# Patient Record
Sex: MALE | Race: BLACK OR AFRICAN AMERICAN
[De-identification: names, ages, dates, MRNs, and addresses within clinical notes are randomized per-mention and may not be internally consistent; named-entity substitution may affect disease eponyms.]

---

## 2018-05-20 ENCOUNTER — HOSPITAL ENCOUNTER (EMERGENCY)
Dept: HOSPITAL 61 - ER | Age: 23
Discharge: HOME | End: 2018-05-20
Payer: COMMERCIAL

## 2018-05-20 DIAGNOSIS — R53.81: ICD-10-CM

## 2018-05-20 DIAGNOSIS — R09.81: ICD-10-CM

## 2018-05-20 DIAGNOSIS — R11.2: Primary | ICD-10-CM

## 2018-05-20 DIAGNOSIS — Z88.0: ICD-10-CM

## 2018-05-20 DIAGNOSIS — F12.10: ICD-10-CM

## 2018-05-20 DIAGNOSIS — J45.909: ICD-10-CM

## 2018-05-20 PROCEDURE — 96361 HYDRATE IV INFUSION ADD-ON: CPT

## 2018-05-20 PROCEDURE — 96374 THER/PROPH/DIAG INJ IV PUSH: CPT

## 2018-05-20 PROCEDURE — 96375 TX/PRO/DX INJ NEW DRUG ADDON: CPT

## 2018-05-20 PROCEDURE — 99285 EMERGENCY DEPT VISIT HI MDM: CPT

## 2018-05-20 RX ADMIN — PROCHLORPERAZINE EDISYLATE 1 MG: 5 INJECTION INTRAMUSCULAR; INTRAVENOUS at 01:57

## 2018-05-20 RX ADMIN — BACITRACIN 1 MLS/HR: 5000 INJECTION, POWDER, FOR SOLUTION INTRAMUSCULAR at 01:58

## 2020-04-30 ENCOUNTER — HOSPITAL ENCOUNTER (EMERGENCY)
Dept: HOSPITAL 61 - ER | Age: 25
Discharge: HOME | End: 2020-04-30
Payer: COMMERCIAL

## 2020-04-30 VITALS — BODY MASS INDEX: 34.35 KG/M2 | WEIGHT: 245.37 LBS | HEIGHT: 71 IN

## 2020-04-30 VITALS — SYSTOLIC BLOOD PRESSURE: 154 MMHG | DIASTOLIC BLOOD PRESSURE: 87 MMHG

## 2020-04-30 DIAGNOSIS — Z76.0: ICD-10-CM

## 2020-04-30 DIAGNOSIS — Z88.8: ICD-10-CM

## 2020-04-30 DIAGNOSIS — R06.02: Primary | ICD-10-CM

## 2020-04-30 DIAGNOSIS — J45.909: ICD-10-CM

## 2020-04-30 PROCEDURE — 71046 X-RAY EXAM CHEST 2 VIEWS: CPT

## 2020-04-30 PROCEDURE — 99283 EMERGENCY DEPT VISIT LOW MDM: CPT

## 2020-04-30 NOTE — PHYS DOC
Past Medical History


Past Medical History:  Asthma


 (CHRISTOPHE OTT)


Past Surgical History:  No Surgical History


 (CHRISTOPHE OTT)


Smoking Status:  Never Smoker


Alcohol Use:  None


Drug Use:  Marijuana


 (CHRISTOPHE OTT)





General Adult


EDM:


Chief Complaint:  SHORTNESS OF BREATH





HPI:


HPI:





Patient is a 24  year old AA male who presents to the ER with complains of 

shortness of breath at night and intermittent wheezing for the last week. Pt 

states he has had difficulty staying asleep because of his asthma. He is out of 

his flovent inhaler and has been using his albuterol as needed.  Patient denies 

any exposure to anyone with COVID-19.  Patient states he has been laid off from 

work and has been staying at home.  He denies any fever, body aches, fatigue, 

nausea, vomiting, diarrhea, abdominal pain, sore throat, headache, cough, or 

chest pain.  He currently denies any pain.


 (CHRISTOPHE OTT)





Review of Systems:


Review of Systems:


Constitutional:  Denies fever or chills. []


Eyes:  Denies change in visual acuity. []


HENT:  Denies nasal congestion or sore throat. [] 


Respiratory:  Denies cough or shortness of breath. [] 


Cardiovascular:  Denies chest pain or edema. [] 


GI:  Denies abdominal pain, nausea, vomiting, or diarrhea. [] 


Musculoskeletal:  Denies back pain or joint pain. [] 


Integument:  Denies rash. [] 


Neurologic:  Denies headache, focal weakness or sensory changes. [] 


Psychiatric:  Denies depression or anxiety. []


 (CHRISTOPHE OTT)





Heart Score:


Risk Factors:


Risk Factors:  DM, Current or recent (<one month) smoker, HTN, HLP, family 

history of CAD, obesity.


Risk Scores:


Score 0 - 3:  2.5% MACE over next 6 weeks - Discharge Home


Score 4 - 6:  20.3% MACE over next 6 weeks - Admit for Clinical Observation


Score 7 - 10:  72.7% MACE over next 6 weeks - Early Invasive Strategies


 (CHRISTOPHE OTT)





Allergies:


Allergies:





Allergies








Coded Allergies Type Severity Reaction Last Updated Verified


 


  nickel Allergy Unknown  5/20/18 Yes








 (CHRISTOPHE OTT)





Physical Exam:


PE:





Constitutional: Well developed, well nourished, no acute distress, non-toxic 

appearance, obese. []


HENT: Normocephalic, atraumatic, bilateral external ears normal, oropharynx 

moist, no oral exudates, nose normal. []


Eyes: PERRLA, EOMI, conjunctiva normal, no discharge. [] 


Neck: Normal range of motion,, no stridor. [] 


Cardiovascular:Heart rate regular rhythm, no murmur []


Lungs & Thorax:  Bilateral breath sounds clear to auscultation, Respirations 

even and unlabored, no retractions, no respiratory distress []


Skin: Warm, dry, no erythema, no rash. [] 


Back: No tenderness


Extremities: No cyanosis, ROM intact, no edema. [] 


Neurologic: Alert and oriented X 3, no focal deficits noted. []


Psychologic: Affect normal, judgement normal, mood normal. []


 (CHRISTOPHE OTT)





Current Patient Data:


Vital Signs:





                                   Vital Signs








  Date Time  Temp Pulse Resp B/P (MAP) Pulse Ox O2 Delivery O2 Flow Rate FiO2


 


4/30/20 15:20 98.2 95 20 154/87 (109) 96 Room Air  





 98.2       








 (CHRISTOPHE OTT)





EKG:


EKG:


[]


 (CHRISTOPHE OTT)





Radiology/Procedures:


Radiology/Procedures:


Chest x-ray normal read by Dr. Garcia []


 (CHRISTOPHE OTT)





Course & Med Decision Making:


Course & Med Decision Making


Pertinent Labs and Imaging studies reviewed. (See chart for details)





[]


 (CHRISTOPHE OTT)





Dragon Disclaimer:


Dragon Disclaimer:


This electronic medical record was generated, in whole or in part, using a voice

 recognition dictation system.


 (CHRISTOPHE OTT)





Departure


Departure


Impression:  


   Primary Impression:  


   Medication refill


   Additional Impression:  


   Waking at night short of breath


Disposition:  01 HOME, SELF-CARE


Condition:  STABLE


Referrals:  


NO PCP (PCP)


Patient Instructions:  Asthma Attacks, Prevention, Medication Refill, Emergency 

Department





Additional Instructions:  


Fill the prescription(s) and use as directed. Avoid triggers such as smoke, 

fragrance, dust, and pollen.  Follow-up with your primary care doctor next week,

 return to the ER if symptoms worsen.


Scripts


Fluticasone Propionate (FLOVENT 44MCG HFA) 10.6 Gm Aer.w.adap


2 PUFF IH BID, #1 INHALER 0 Refills


   Prov: CHRISTOPHE OTT APRNINI         4/30/20





Attending Signature


Attending Signature


 


I have reviewed the PA/NP's note and plan of care. I was available for 

consultation as needed during the patient's visit in the emergency department. I

 agree with the clinical impression, plan, and disposition.


 (KATI GARCIA DO)











CHRISTOPHE OTT       Apr 30, 2020 17:03


KATI GARCIA DO             Apr 30, 2020 23:27

## 2020-04-30 NOTE — RAD
EXAM: PA and Lateral Views of the Chest

 

DATE: 4/30/2020 3:45 PM

 

INDICATION: Shortness of air

 

COMPARISON: No Prior

 

FINDINGS:

 

 

The heart is not enlarged.

 

Mediastinal and hilar contours are normal.

 

No focal parenchymal airspace opacity.

 

No pleural effusion or pneumothorax.

 

 

 

IMPRESSION:

1.  No radiographic evidence for acute cardiopulmonary process.

 

Electronically signed by: Sage Duarn MD (4/30/2020 5:08 PM) MARCIA